# Patient Record
Sex: MALE | Race: WHITE | Employment: STUDENT | URBAN - METROPOLITAN AREA
[De-identification: names, ages, dates, MRNs, and addresses within clinical notes are randomized per-mention and may not be internally consistent; named-entity substitution may affect disease eponyms.]

---
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
## 2025-05-13 ENCOUNTER — OFFICE VISIT (OUTPATIENT)
Dept: FAMILY MEDICINE CLINIC | Facility: CLINIC | Age: 20
End: 2025-05-13
Payer: COMMERCIAL

## 2025-05-13 ENCOUNTER — LAB ENCOUNTER (OUTPATIENT)
Dept: LAB | Age: 20
End: 2025-05-13
Attending: NURSE PRACTITIONER
Payer: COMMERCIAL

## 2025-05-13 VITALS
RESPIRATION RATE: 18 BRPM | DIASTOLIC BLOOD PRESSURE: 70 MMHG | OXYGEN SATURATION: 98 % | HEART RATE: 102 BPM | SYSTOLIC BLOOD PRESSURE: 126 MMHG | HEIGHT: 75 IN | BODY MASS INDEX: 22.06 KG/M2 | TEMPERATURE: 99 F | WEIGHT: 177.38 LBS

## 2025-05-13 DIAGNOSIS — R50.9 FEVER, UNSPECIFIED: ICD-10-CM

## 2025-05-13 DIAGNOSIS — J02.9 SORE THROAT: Primary | ICD-10-CM

## 2025-05-13 DIAGNOSIS — J03.90 EXUDATIVE TONSILLITIS: ICD-10-CM

## 2025-05-13 DIAGNOSIS — J02.9 SORE THROAT: ICD-10-CM

## 2025-05-13 DIAGNOSIS — R53.83 FATIGUE, UNSPECIFIED TYPE: ICD-10-CM

## 2025-05-13 LAB
CONTROL LINE PRESENT WITH A CLEAR BACKGROUND (YES/NO): YES YES/NO
KIT LOT #: NORMAL NUMERIC
STREP GRP A CUL-SCR: NEGATIVE

## 2025-05-13 PROCEDURE — 99213 OFFICE O/P EST LOW 20 MIN: CPT | Performed by: NURSE PRACTITIONER

## 2025-05-13 PROCEDURE — 87880 STREP A ASSAY W/OPTIC: CPT | Performed by: NURSE PRACTITIONER

## 2025-05-13 PROCEDURE — 87637 SARSCOV2&INF A&B&RSV AMP PRB: CPT | Performed by: NURSE PRACTITIONER

## 2025-05-13 PROCEDURE — 36415 COLL VENOUS BLD VENIPUNCTURE: CPT

## 2025-05-13 PROCEDURE — 86308 HETEROPHILE ANTIBODY SCREEN: CPT

## 2025-05-13 NOTE — PROGRESS NOTES
Subjective:   Patient ID: Slick Reyes is a 19 year old male.    Patient is a 19 year old male who presents today with his mother for complaints fo fever (TMAX 102F), body aches, chills, sore throat, nasal congestion, fatigue, headache, slight cough x 3 days. Denies runny nose, ear pain, SOB, wheezing or rashes. Decreased appetite, tolerating fluids. Attempted treatment prior to arrival = Dayquil, Advil and Tylenol (LD 0700). No ill contacts they can identify.        History/Other:   Review of Systems   Constitutional:  Positive for appetite change, chills, fatigue and fever.   HENT:  Positive for congestion and sore throat. Negative for ear pain and rhinorrhea.    Respiratory:  Positive for cough. Negative for shortness of breath and wheezing.    Musculoskeletal:  Positive for myalgias.   Skin:  Negative for rash.   Neurological:  Positive for headaches.     Current Medications[1]  Allergies:Allergies[2]    /70   Pulse 102   Temp 98.7 °F (37.1 °C)   Resp 18   Ht 6' 3\" (1.905 m)   Wt 177 lb 6.4 oz (80.5 kg)   SpO2 98%   BMI 22.17 kg/m²       Objective:   Physical Exam  Vitals reviewed.   Constitutional:       General: He is awake. He is not in acute distress.     Appearance: Normal appearance. He is well-developed and well-groomed. He is not ill-appearing, toxic-appearing or diaphoretic.   HENT:      Head: Normocephalic and atraumatic.      Right Ear: Tympanic membrane, ear canal and external ear normal.      Left Ear: Tympanic membrane, ear canal and external ear normal.      Nose: Nose normal.      Mouth/Throat:      Lips: Pink.      Mouth: Mucous membranes are moist. No oral lesions.      Pharynx: Oropharynx is clear. Uvula midline. Posterior oropharyngeal erythema present. No oropharyngeal exudate.      Tonsils: Tonsillar exudate present. 2+ on the right. 2+ on the left.     Cardiovascular:      Rate and Rhythm: Normal rate and regular rhythm.   Pulmonary:      Effort: Pulmonary effort is  normal. No respiratory distress.      Breath sounds: Normal breath sounds and air entry. No decreased breath sounds, wheezing, rhonchi or rales.   Abdominal:      General: Abdomen is flat. Bowel sounds are normal. There is no distension.      Palpations: Abdomen is soft.      Tenderness: There is no abdominal tenderness. There is no guarding.   Lymphadenopathy:      Head:      Right side of head: Tonsillar adenopathy present.      Left side of head: Tonsillar and occipital adenopathy present.      Cervical: No cervical adenopathy.   Skin:     General: Skin is warm and dry.   Neurological:      Mental Status: He is alert and oriented to person, place, and time.   Psychiatric:         Behavior: Behavior is cooperative.         Assessment & Plan:   1. Sore throat    2. Fatigue, unspecified type    3. Fever, unspecified    4. Exudative tonsillitis        Orders Placed This Encounter   Procedures    Strep A Assay W/Optic    Mono Qual, Rfx to EBV-VCA on Neg [E]    SARS-CoV-2/Flu A and B/RSV by PCR ([facility])     Results for orders placed or performed in visit on 05/13/25   Strep A Assay W/Optic    Collection Time: 05/13/25  2:20 PM   Result Value Ref Range    Strep Grp A Screen negative Negative    Control Line Present with a clear background (yes/no) yes Yes/No    Kit Lot # [id number] Numeric    Kit Expiration Date 06/03/2026 Date       Meds This Visit:  Requested Prescriptions      No prescriptions requested or ordered in this encounter     Reviewed POC test results with patient/mother.  Quad screen collected and sent - will notify of results.  Mono testing ordered- will notify of results.  Reassuring physical exam findings. Vitals WNL. No sign of RDS or dehydration at this time.  Supportive care and return to care measures reviewed.  Patient and mother v/u and are comfortable with this plan.    Patient Instructions   Tylenol and Motrin as needed (can alterate every 3-4 hours as needed)  Push fluids, rest  We will notify  you when nasal swab and mono test results received  Return to care for new/worsening symptoms         [1]   No current outpatient medications on file.   [2] No Known Allergies

## 2025-05-13 NOTE — PATIENT INSTRUCTIONS
Tylenol and Motrin as needed (can alterate every 3-4 hours as needed)  Push fluids, rest  We will notify you when nasal swab and mono test results received  Return to care for new/worsening symptoms

## 2025-05-14 LAB
FLUAV + FLUBV RNA SPEC NAA+PROBE: NEGATIVE
FLUAV + FLUBV RNA SPEC NAA+PROBE: NEGATIVE
HETEROPH AB SER QL: POSITIVE
RSV RNA SPEC NAA+PROBE: NEGATIVE
SARS-COV-2 RNA RESP QL NAA+PROBE: NOT DETECTED

## 2025-05-18 ENCOUNTER — HOSPITAL ENCOUNTER (EMERGENCY)
Facility: HOSPITAL | Age: 20
Discharge: HOME | End: 2025-05-18
Attending: EMERGENCY MEDICINE
Payer: COMMERCIAL

## 2025-05-18 ENCOUNTER — APPOINTMENT (OUTPATIENT)
Dept: CT IMAGING | Facility: HOSPITAL | Age: 20
End: 2025-05-18
Attending: EMERGENCY MEDICINE
Payer: COMMERCIAL

## 2025-05-18 VITALS
SYSTOLIC BLOOD PRESSURE: 132 MMHG | DIASTOLIC BLOOD PRESSURE: 64 MMHG | HEIGHT: 74.5 IN | TEMPERATURE: 98 F | OXYGEN SATURATION: 96 % | HEART RATE: 104 BPM | BODY MASS INDEX: 22.73 KG/M2 | RESPIRATION RATE: 16 BRPM | WEIGHT: 179 LBS

## 2025-05-18 DIAGNOSIS — R74.01 TRANSAMINITIS: ICD-10-CM

## 2025-05-18 DIAGNOSIS — B27.90 INFECTIOUS MONONUCLEOSIS WITHOUT COMPLICATION, INFECTIOUS MONONUCLEOSIS DUE TO UNSPECIFIED ORGANISM: Primary | ICD-10-CM

## 2025-05-18 DIAGNOSIS — J03.80 ACUTE BACTERIAL TONSILLITIS: ICD-10-CM

## 2025-05-18 DIAGNOSIS — B96.89 ACUTE BACTERIAL TONSILLITIS: ICD-10-CM

## 2025-05-18 LAB
ALBUMIN SERPL-MCNC: 4.4 G/DL (ref 3.2–4.8)
ALBUMIN/GLOB SERPL: 1.3 {RATIO} (ref 1–2)
ALP LIVER SERPL-CCNC: 243 U/L (ref 45–117)
ALT SERPL-CCNC: 772 U/L (ref 10–49)
ANION GAP SERPL CALC-SCNC: 9 MMOL/L (ref 0–18)
AST SERPL-CCNC: 536 U/L (ref ?–34)
BILIRUB SERPL-MCNC: 0.5 MG/DL (ref 0.3–1.2)
BUN BLD-MCNC: 17 MG/DL (ref 9–23)
BUN/CREAT SERPL: 13.6 (ref 10–20)
CALCIUM BLD-MCNC: 9.3 MG/DL (ref 8.7–10.4)
CHLORIDE SERPL-SCNC: 101 MMOL/L (ref 98–112)
CO2 SERPL-SCNC: 28 MMOL/L (ref 21–32)
CREAT BLD-MCNC: 1.25 MG/DL (ref 0.7–1.3)
EGFRCR SERPLBLD CKD-EPI 2021: 85 ML/MIN/1.73M2 (ref 60–?)
GLOBULIN PLAS-MCNC: 3.4 G/DL (ref 2–3.5)
GLUCOSE BLD-MCNC: 106 MG/DL (ref 70–99)
MAGNESIUM SERPL-MCNC: 1.9 MG/DL (ref 1.6–2.6)
OSMOLALITY SERPL CALC.SUM OF ELEC: 288 MOSM/KG (ref 275–295)
POTASSIUM SERPL-SCNC: 5 MMOL/L (ref 3.5–5.1)
PROT SERPL-MCNC: 7.8 G/DL (ref 5.7–8.2)
SODIUM SERPL-SCNC: 138 MMOL/L (ref 136–145)

## 2025-05-18 PROCEDURE — 83735 ASSAY OF MAGNESIUM: CPT | Performed by: EMERGENCY MEDICINE

## 2025-05-18 PROCEDURE — 85027 COMPLETE CBC AUTOMATED: CPT | Performed by: EMERGENCY MEDICINE

## 2025-05-18 PROCEDURE — 85060 BLOOD SMEAR INTERPRETATION: CPT | Performed by: EMERGENCY MEDICINE

## 2025-05-18 PROCEDURE — 70491 CT SOFT TISSUE NECK W/DYE: CPT | Performed by: EMERGENCY MEDICINE

## 2025-05-18 PROCEDURE — 80053 COMPREHEN METABOLIC PANEL: CPT | Performed by: EMERGENCY MEDICINE

## 2025-05-18 PROCEDURE — 99284 EMERGENCY DEPT VISIT MOD MDM: CPT

## 2025-05-18 PROCEDURE — 99285 EMERGENCY DEPT VISIT HI MDM: CPT

## 2025-05-18 PROCEDURE — 85025 COMPLETE CBC W/AUTO DIFF WBC: CPT | Performed by: EMERGENCY MEDICINE

## 2025-05-18 PROCEDURE — 87430 STREP A AG IA: CPT | Performed by: EMERGENCY MEDICINE

## 2025-05-18 PROCEDURE — 96375 TX/PRO/DX INJ NEW DRUG ADDON: CPT

## 2025-05-18 PROCEDURE — 85007 BL SMEAR W/DIFF WBC COUNT: CPT | Performed by: EMERGENCY MEDICINE

## 2025-05-18 PROCEDURE — 96374 THER/PROPH/DIAG INJ IV PUSH: CPT

## 2025-05-18 PROCEDURE — 96361 HYDRATE IV INFUSION ADD-ON: CPT

## 2025-05-18 RX ORDER — IBUPROFEN 200 MG
200 TABLET ORAL EVERY 6 HOURS PRN
COMMUNITY

## 2025-05-18 RX ORDER — ACETAMINOPHEN AND CODEINE PHOSPHATE 120; 12 MG/5ML; MG/5ML
10 SOLUTION ORAL EVERY 6 HOURS PRN
Qty: 118 ML | Refills: 0 | Status: SHIPPED | OUTPATIENT
Start: 2025-05-18

## 2025-05-18 RX ORDER — IBUPROFEN 600 MG/1
600 TABLET, FILM COATED ORAL EVERY 8 HOURS PRN
Qty: 20 TABLET | Refills: 0 | Status: SHIPPED | OUTPATIENT
Start: 2025-05-18 | End: 2025-05-25

## 2025-05-18 RX ORDER — ACETAMINOPHEN 500 MG
500 TABLET ORAL EVERY 6 HOURS PRN
COMMUNITY

## 2025-05-18 RX ORDER — DEXAMETHASONE SODIUM PHOSPHATE 10 MG/ML
10 INJECTION, SOLUTION INTRAMUSCULAR; INTRAVENOUS ONCE
Status: COMPLETED | OUTPATIENT
Start: 2025-05-18 | End: 2025-05-18

## 2025-05-18 RX ORDER — KETOROLAC TROMETHAMINE 15 MG/ML
15 INJECTION, SOLUTION INTRAMUSCULAR; INTRAVENOUS ONCE
Status: COMPLETED | OUTPATIENT
Start: 2025-05-18 | End: 2025-05-18

## 2025-05-18 NOTE — ED INITIAL ASSESSMENT (HPI)
Pt. Arrived to ED with his mother via walk in for c/o sore throat. Pt. Diagnosed with mono 10 days ago. Pt. Sore throat worsening. Pt. Unable to eat, drink or sleep.

## 2025-05-18 NOTE — ED PROVIDER NOTES
Patient Seen in: [facility] Emergency Department    History     Chief Complaint   Patient presents with    Sore Throat     Pain rated 8/10    Swallowing Problem    Nausea/Vomiting/Diarrhea     Vomiting x2 caused by gag       HPI    19-year-old male who has been having a sore throat for the past 10 days.  Patient was recently seen at immediate care was diagnosed with mono.  Patient is sore throat is been getting worse and is very difficult for him to swallow.  No chest pain or shortness of breath.    History reviewed. Past Medical History[1]    History reviewed. Past Surgical History[2]      Medications :  Prescriptions Prior to Admission[3]     Family History[4]    Smoking Status: Social Hx on file[5]    Constitutional and vital signs reviewed.      Social History and Family History elements reviewed from today, pertinent positives to the presenting problem noted.    Physical Exam     ED Triage Vitals [05/18/25 0353]   /83   Pulse 111   Resp 20   Temp 98.4 °F (36.9 °C)   Temp src    SpO2 96 %   O2 Device None (Room air)       All measures to prevent infection transmission during my interaction with the patient were taken. Handwashing was performed prior to and after the exam.  Stethoscope and any equipment used during my examination was cleaned with super sani-cloth germicidal wipes following the exam.     Physical Exam  Vitals and nursing note reviewed.   HENT:      Mouth/Throat:      Mouth: Mucous membranes are dry.      Pharynx: Pharyngeal swelling, oropharyngeal exudate and posterior oropharyngeal erythema present.      Comments: Uvula midline, bilateral pharyngeal swelling and erythema with exudates.  Cardiovascular:      Rate and Rhythm: Normal rate.   Pulmonary:      Effort: Pulmonary effort is normal.   Abdominal:      Palpations: Abdomen is soft.   Skin:     General: Skin is warm and dry.   Neurological:      General: No focal deficit present.      Mental Status: He is alert and oriented to  person, place, and time.         ED Course        Labs Reviewed   COMP METABOLIC PANEL (14) - Abnormal; Notable for the following components:       Result Value    Glucose 106 (*)      (*)      (*)     Alkaline Phosphatase 243 (*)     All other components within normal limits   CBC WITH DIFFERENTIAL WITH PLATELET - Abnormal; Notable for the following components:    WBC 14.3 (*)     All other components within normal limits   MAGNESIUM - Normal   RAPID STREP A SCREEN (LC) - Normal    Narrative:     A confirmatory culture is recommended if clinically indicated.   SCAN SLIDE   MD BLOOD SMEAR CONSULT       As Interpreted by me    Imaging Results Available and Reviewed while in ED: No results found.  Preliminary Radiology Report  [facility], [facility]  [phone] - Phone    [facility]    NAME: JUVENAL PEDROZA    DATE OF EXAM: 05/18/2025  Patient No:  [id number]  Physician:  LEXUS  YOB: 2005    Past Medical History (entered by Technologist):    Reason For Exam (entered by Technologist):  mono  Other Notes (entered by Technologist):     Additional Information (per [facility]):            CT neck with contrast    Comparison: None    IMPRESSION:  Multistation lymphadenopathy, may be reactive and compatible with reported mononucleosis.  However, there is an area of more focal fluid along the right level 2 cervical lymph nodes (2-44), which measures approximately 14 x 11 mm.  Lymphadenopathy likely has some local mass effect including on the right internal jugular vein which appears markedly flattened and poorly visualized (6-55).  Suppuration is not excluded and could be better assessed with targeted soft tissue ultrasound.  If soft tissue ultrasound is pursued can also consider interrogating the right internal jugular vein to ensure patency.    Pronounced swelling of the Walldeyer's ring, may be compatible with reported mononucleosis.  There is  some associated effacement of the airways most pronounced in the nasopharynx.  Otherwise grossly patent airways.      Report finalized on 05/18/25 at 7:18 AM ET.      Dr. Cecilia Forrest MD  This report has been electronically signed and verified by the Radiologist whose name is printed above.       This report contains privileged and confidential information and is intended solely for the use of the individual or entity to which it is addressed. If you are not the intended recipient of this report, you are hereby notified that any copying, distribution, dissemination or action taken in relation to the contents of this report is strictly prohibited and may be unlawful. If you have received this report in error, please notify the sender immediately at [phone] and permanently delete the original report and destroy any copies or printouts.    ED Medications Administered:   Medications   amoxicillin clavulanate (Augmentin) 875-125 MG per tab 875 mg (has no administration in time range)   sodium chloride 0.9 % IV bolus 1,000 mL (0 mL Intravenous Stopped 5/18/25 0542)   ketorolac (Toradol) 15 MG/ML injection 15 mg (15 mg Intravenous Given 5/18/25 0429)   dexAMETHasone PF (Decadron) 10 MG/ML injection 10 mg (10 mg Intravenous Given 5/18/25 0551)   iopamidol 76% (ISOVUE-370) injection for power injector (60 mL Intravenous Given 5/18/25 0542)         MDM     Vitals:    05/18/25 0353 05/18/25 0445   BP: 130/83 135/80   Pulse: 111 96   Resp: 20 14   Temp: 98.4 °F (36.9 °C)    SpO2: 96% 100%   Weight: 81.2 kg    Height: 189.2 cm (6' 2.5\")      *I personally reviewed and interpreted all ED vitals.    Pulse Ox: 96%, Room air, Normal     Monitor Interpretation:   normal sinus rhythm as interpreted by me.  The cardiac monitor was ordered to monitor cardiac rate and rhythm.    Differential Diagnosis/ Diagnostic Considerations: Pharyngitis, tonsillitis, retropharyngeal abscess    Complicating Factors: The patient already has  does not have a problem list on file. to contribute to the complexity of this ED evaluation.    Medical Decision Making  Amount and/or Complexity of Data Reviewed  Independent Historian: parent  External Data Reviewed: notes.     Details: Reviewed primary care provider notes Dr. Machado to help contribute to patient's medical history  Labs: ordered. Decision-making details documented in ED Course.  Radiology: ordered and independent interpretation performed. Decision-making details documented in ED Course.    Risk  OTC drugs.  Prescription drug management.    Patient feels little bit better.  Tolerating p.o. fluids and oral antibiotics.  I explained all the results to him and his mother at the bedside.  He has a slightly elevated WBC count of 14 along with elevated liver enzymes which are consistent with a mononucleosis infection.  CT shows no signs of an abscess.  There are reactive lymph nodes which are consistent with mononucleosis diagnosis.  However due to the elevated WBC count and clinically with the exudates pharyngeal erythema is concerns superimposed bacterial infection on his tonsils/pharynx.  Will treat with Augmentin.  Will also give him some Tylenol/codeine syrup to help with the pain of swallowing.  They do understand to take all the medication as prescribed.  Follow-up with his primary care provider 1 to 2 days for reevaluation along with recheck his liver function to make sure they come back to normal after the mononucleosis is improving.  Given ENT referral in case the throat pain and severity of symptoms do not get better.  Return to the ER if some continue, get worse, unable to follow-up    Condition upon leaving the department: Stable    Disposition and Plan     Clinical Impression:  1. Infectious mononucleosis without complication, infectious mononucleosis due to unspecified organism    2. Acute bacterial tonsillitis    3. Transaminitis        Disposition:  Discharge    Follow-up:  Drew Machado  MD  [address]  [facility]  [facility] [address]  [phone]    Follow up      Latanya Arriaga MD  [address] [facility] [address]  [phone]    Follow up        Medications Prescribed:  Current Discharge Medication List        START taking these medications    Details   !! ibuprofen 600 MG Oral Tab Take 1 tablet (600 mg total) by mouth every 8 (eight) hours as needed for Pain or Fever.  Qty: 20 tablet, Refills: 0      amoxicillin clavulanate 875-125 MG Oral Tab Take 1 tablet by mouth 2 (two) times daily for 10 days.  Qty: 20 tablet, Refills: 0      acetaminophen-codeine 120-12 MG/5ML Oral Solution Take 10 mL by mouth every 6 (six) hours as needed for Pain.  Qty: 118 mL, Refills: 0    Associated Diagnoses: Infectious mononucleosis without complication, infectious mononucleosis due to unspecified organism; Acute bacterial tonsillitis       !! - Potential duplicate medications found. Please discuss with provider.                           [1]   Past Medical History:   Fetal distress affecting management of mother    Fracture of clavicle    Rt Clavicle    Mononucleosis   [2] History reviewed. No pertinent surgical history.  [3] (Not in a hospital admission)   [4]   Family History  Problem Relation Age of Onset    Other ([place]) Mother     Cancer Other         mga; lung    High Cholesterol Maternal Grandmother     High Blood Pressure Maternal Grandmother     High Cholesterol Maternal Grandfather     Asthma Other         mggm    High Blood Pressure Maternal Grandfather     High Blood Pressure Other         mggm    High Blood Pressure Other         mggf   [5]   Social History  Socioeconomic History    Marital status: Single   Tobacco Use    Smoking status: Never    Smokeless tobacco: Never   Vaping Use    Vaping status: Never Used   Substance and Sexual Activity    Alcohol use: Never    Drug use: Never   Other Topics Concern    Reaction to local anesthetic No    Pt has a pacemaker No    Pt has a  defibrillator No

## 2025-05-19 LAB
BASOPHILS # BLD: 0 X10(3) UL (ref 0–0.2)
BASOPHILS NFR BLD: 0 %
DEPRECATED RDW RBC AUTO: 41.9 FL (ref 35.1–46.3)
EOSINOPHIL # BLD: 0 X10(3) UL (ref 0–0.7)
EOSINOPHIL NFR BLD: 0 %
ERYTHROCYTE [DISTWIDTH] IN BLOOD BY AUTOMATED COUNT: 12.5 % (ref 11–15)
HCT VFR BLD AUTO: 44.2 % (ref 39–53)
HGB BLD-MCNC: 14.3 G/DL (ref 13–17.5)
LYMPHOCYTES NFR BLD: 35 %
LYMPHOCYTES NFR BLD: 8.15 X10(3) UL (ref 1.5–5)
MCH RBC QN AUTO: 29.3 PG (ref 26–34)
MCHC RBC AUTO-ENTMCNC: 32.4 G/DL (ref 31–37)
MCV RBC AUTO: 90.6 FL (ref 80–100)
MONOCYTES # BLD: 0.86 X10(3) UL (ref 0.1–1)
MONOCYTES NFR BLD: 6 %
MORPHOLOGY: NORMAL
NEUTROPHILS # BLD AUTO: 3.69 X10 (3) UL (ref 1.5–7.7)
NEUTROPHILS NFR BLD: 33 %
NEUTS BAND NFR BLD: 4 %
NEUTS HYPERSEG # BLD: 5.29 X10(3) UL (ref 1.5–7.7)
PLATELET # BLD AUTO: 162 10(3)UL (ref 150–450)
RBC # BLD AUTO: 4.88 X10(6)UL (ref 4.3–5.7)
TOTAL CELLS COUNTED BLD: 100
VARIANT LYMPHS NFR BLD MANUAL: 22 % (ref ?–4)
WBC # BLD AUTO: 14.3 X10(3) UL (ref 4–11)

## 2025-05-20 ENCOUNTER — HOSPITAL ENCOUNTER (OUTPATIENT)
Age: 20
Discharge: HOME | End: 2025-05-20
Attending: EMERGENCY MEDICINE
Payer: COMMERCIAL

## 2025-05-20 ENCOUNTER — TELEPHONE (OUTPATIENT)
Dept: FAMILY MEDICINE CLINIC | Facility: CLINIC | Age: 20
End: 2025-05-20

## 2025-05-20 VITALS
HEART RATE: 92 BPM | RESPIRATION RATE: 16 BRPM | DIASTOLIC BLOOD PRESSURE: 67 MMHG | OXYGEN SATURATION: 98 % | SYSTOLIC BLOOD PRESSURE: 146 MMHG | TEMPERATURE: 101 F

## 2025-05-20 DIAGNOSIS — B27.90 INFECTIOUS MONONUCLEOSIS WITHOUT COMPLICATION, INFECTIOUS MONONUCLEOSIS DUE TO UNSPECIFIED ORGANISM: Primary | ICD-10-CM

## 2025-05-20 DIAGNOSIS — E86.0 DEHYDRATION: ICD-10-CM

## 2025-05-20 DIAGNOSIS — E87.5 HYPERKALEMIA: ICD-10-CM

## 2025-05-20 LAB
#MXD IC: 1.2 X10ˆ3/UL (ref 0.1–1)
BASOPHILS # BLD AUTO: 0.03 X10(3) UL (ref 0–0.2)
BASOPHILS NFR BLD AUTO: 0.2 %
BUN BLD-MCNC: 21 MG/DL (ref 7–18)
CHLORIDE BLD-SCNC: 101 MMOL/L (ref 98–112)
CO2 BLD-SCNC: 28 MMOL/L (ref 21–32)
CREAT BLD-MCNC: 1.4 MG/DL (ref 0.7–1.3)
DEPRECATED RDW RBC AUTO: 43.9 FL (ref 35.1–46.3)
EGFRCR SERPLBLD CKD-EPI 2021: 74 ML/MIN/1.73M2 (ref 60–?)
EOSINOPHIL # BLD AUTO: 0.03 X10(3) UL (ref 0–0.7)
EOSINOPHIL NFR BLD AUTO: 0.2 %
ERYTHROCYTE [DISTWIDTH] IN BLOOD BY AUTOMATED COUNT: 12.7 % (ref 11–15)
GLUCOSE BLD-MCNC: 105 MG/DL (ref 70–99)
HCT VFR BLD AUTO: 46 % (ref 39–53)
HCT VFR BLD AUTO: 46.1 % (ref 39–53)
HCT VFR BLD CALC: 50 % (ref 37–53)
HGB BLD-MCNC: 14.3 G/DL (ref 13–17.5)
HGB BLD-MCNC: 14.7 G/DL (ref 13–17.5)
IMM GRANULOCYTES # BLD AUTO: 0.13 X10(3) UL (ref 0–1)
IMM GRANULOCYTES NFR BLD: 0.8 %
ISTAT IONIZED CALCIUM FOR CHEM 8: 1.21 MMOL/L (ref 1.12–1.32)
LYMPHOCYTES # BLD AUTO: 10.1 X10ˆ3/UL (ref 1.5–5)
LYMPHOCYTES # BLD AUTO: 10.84 X10(3) UL (ref 1.5–5)
LYMPHOCYTES NFR BLD AUTO: 69 %
LYMPHOCYTES NFR BLD AUTO: 70.1 %
MCH RBC QN AUTO: 29.1 PG (ref 26–34)
MCH RBC QN AUTO: 29.8 PG (ref 26–34)
MCHC RBC AUTO-ENTMCNC: 31.1 G/DL (ref 31–37)
MCHC RBC AUTO-ENTMCNC: 31.9 G/DL (ref 31–37)
MCV RBC AUTO: 93.5 FL (ref 80–100)
MCV RBC AUTO: 93.5 FL (ref 80–100)
MIXED CELL %: 8.2 %
MONOCYTES # BLD AUTO: 1.16 X10(3) UL (ref 0.1–1)
MONOCYTES NFR BLD AUTO: 7.5 %
NEUTROPHILS # BLD AUTO: 3.28 X10 (3) UL (ref 1.5–7.7)
NEUTROPHILS # BLD AUTO: 3.28 X10(3) UL (ref 1.5–7.7)
NEUTROPHILS # BLD AUTO: 3.4 X10ˆ3/UL (ref 1.5–7.7)
NEUTROPHILS NFR BLD AUTO: 21.2 %
NEUTROPHILS NFR BLD AUTO: 22.8 %
PLATELET # BLD AUTO: 142 10(3)UL (ref 150–450)
POTASSIUM BLD-SCNC: 5.5 MMOL/L (ref 3.6–5.1)
RBC # BLD AUTO: 4.92 X10ˆ6/UL (ref 4.3–5.7)
RBC # BLD AUTO: 4.93 X10(6)UL (ref 4.3–5.7)
SODIUM BLD-SCNC: 140 MMOL/L (ref 136–145)
WBC # BLD AUTO: 14.7 X10ˆ3/UL (ref 4–11)
WBC # BLD AUTO: 15.5 X10(3) UL (ref 4–11)

## 2025-05-20 PROCEDURE — 85025 COMPLETE CBC W/AUTO DIFF WBC: CPT | Performed by: EMERGENCY MEDICINE

## 2025-05-20 PROCEDURE — 80047 BASIC METABLC PNL IONIZED CA: CPT

## 2025-05-20 PROCEDURE — 96361 HYDRATE IV INFUSION ADD-ON: CPT

## 2025-05-20 PROCEDURE — 96375 TX/PRO/DX INJ NEW DRUG ADDON: CPT

## 2025-05-20 PROCEDURE — 99214 OFFICE O/P EST MOD 30 MIN: CPT

## 2025-05-20 PROCEDURE — 96374 THER/PROPH/DIAG INJ IV PUSH: CPT

## 2025-05-20 RX ORDER — ACETAMINOPHEN 500 MG
1000 TABLET ORAL ONCE
Status: COMPLETED | OUTPATIENT
Start: 2025-05-20 | End: 2025-05-20

## 2025-05-20 RX ORDER — KETOROLAC TROMETHAMINE 30 MG/ML
15 INJECTION, SOLUTION INTRAMUSCULAR; INTRAVENOUS ONCE
Status: COMPLETED | OUTPATIENT
Start: 2025-05-20 | End: 2025-05-20

## 2025-05-20 RX ORDER — SODIUM CHLORIDE 9 MG/ML
1000 INJECTION, SOLUTION INTRAVENOUS ONCE
Status: COMPLETED | OUTPATIENT
Start: 2025-05-20 | End: 2025-05-20

## 2025-05-20 RX ORDER — IBUPROFEN 600 MG/1
600 TABLET, FILM COATED ORAL ONCE
Status: DISCONTINUED | OUTPATIENT
Start: 2025-05-20 | End: 2025-05-20

## 2025-05-20 RX ORDER — DEXAMETHASONE SODIUM PHOSPHATE 10 MG/ML
10 INJECTION, SOLUTION INTRAMUSCULAR; INTRAVENOUS ONCE
Status: COMPLETED | OUTPATIENT
Start: 2025-05-20 | End: 2025-05-20

## 2025-05-20 RX ORDER — ACETAMINOPHEN AND CODEINE PHOSPHATE 300; 30 MG/12.5ML; MG/12.5ML
12.5 SOLUTION ORAL 4 TIMES DAILY PRN
Qty: 118 ML | Refills: 0 | Status: SHIPPED | OUTPATIENT
Start: 2025-05-20

## 2025-05-20 NOTE — ED QUICK NOTES
Patient states he is feeling slightly better. Spitting saliva into a bucket. States it is too hard for him to swallow. IV fluids completed. IV changed to SL.

## 2025-05-20 NOTE — ED PROVIDER NOTES
Patient Seen in: [facility]      History     Chief Complaint   Patient presents with    Cough     Need refill on prescription for tylenol with codeine for Mono - Entered by patient     Stated Complaint: Cough - Need refill on prescription for tylenol with codeine for Mono    Subjective:     19-year-old male presents today for evaluation sore throat trouble swallowing.  Patient reports recently diagnosed with mono.  He has been sick for 10 to 14 days.  Was worse yesterday has seemed to plateau off.  Nearly out of his Tylenol with codeine that he was given for his sore throat.  No vomiting or diarrhea.  No cough.    Objective:   Past Medical History:    Fetal distress affecting management of mother    Fracture of clavicle    Rt Clavicle    Mononucleosis              History reviewed. No pertinent surgical history.             Social History     Socioeconomic History    Marital status: Single   Tobacco Use    Smoking status: Never    Smokeless tobacco: Never   Vaping Use    Vaping status: Never Used   Substance and Sexual Activity    Alcohol use: Never    Drug use: Never   Other Topics Concern    Reaction to local anesthetic No    Pt has a pacemaker No    Pt has a defibrillator No   Social History Narrative    ** Merged History Encounter **                     Physical Exam     ED Triage Vitals [05/20/25 1640]   /66   Pulse 107   Resp 21   Temp 98.3 °F (36.8 °C)   Temp src Oral   SpO2 100 %   O2 Device None (Room air)       Current:/67   Pulse 92   Temp (!) 100.8 °F (38.2 °C) (Oral)   Resp 16   SpO2 98%         Physical Exam  Vitals reviewed.   Constitutional:       General: He is not in acute distress.     Appearance: Normal appearance. He is not ill-appearing or toxic-appearing.   HENT:      Head: Normocephalic and atraumatic.      Mouth/Throat:      Mouth: Mucous membranes are moist.      Pharynx: Uvula midline. Oropharyngeal exudate and posterior oropharyngeal erythema present. No  pharyngeal swelling or uvula swelling.      Tonsils: Tonsillar exudate present. No tonsillar abscesses.   Eyes:      Extraocular Movements: Extraocular movements intact.      Conjunctiva/sclera: Conjunctivae normal.   Cardiovascular:      Rate and Rhythm: Normal rate and regular rhythm.   Pulmonary:      Effort: Pulmonary effort is normal.      Breath sounds: Normal breath sounds.   Abdominal:      Palpations: Abdomen is soft.      Tenderness: There is no abdominal tenderness.   Musculoskeletal:      Cervical back: Neck supple.   Lymphadenopathy:      Cervical: Cervical adenopathy present.   Skin:     General: Skin is warm and dry.   Neurological:      Mental Status: He is alert and oriented to person, place, and time.   Psychiatric:         Mood and Affect: Mood normal.         ED Course     Labs Reviewed   POCT CBC - Abnormal; Notable for the following components:       Result Value    WBC IC 14.7 (*)     # Lymphocyte 10.1 (*)     # Mixed Cells 1.2 (*)     All other components within normal limits   POCT ISTAT CHEM8 CARTRIDGE - Abnormal; Notable for the following components:    ISTAT BUN 21 (*)     ISTAT Potassium 5.5 (*)     ISTAT Glucose 105 (*)     ISTAT Creatinine 1.40 (*)     All other components within normal limits   CBC W AUTO DIFF     Imaging:  No results found.    ED Course as of 05/20/25 1823  ------------------------------------------------------------  Time: 05/20 1652  Comment: Patient had lab work and CT scan done on 5/18/2025.  White count 14,000, LFTs elevated with AST of 536 and ALT of 772.  CT scan showed no abscess.  Patient tested positive for mono on 5/13/2025.  Patient currently taking antibiotics.  ------------------------------------------------------------  Time: 05/20 1716  Comment: Work reveals white count 14.7 with a normal hemoglobin and platelet count 156 which was clumped.  BUN was 21, creatinine 1.4, potassium  5.5.  ------------------------------------------------------------  Time: 05/20 1717  Comment: BUN was 17, creatinine 1.25 and potassium was 5.0 on 5-18.  ------------------------------------------------------------  Time: 05/20 1814  Comment: Discussed with patient and dad at length.  Patient able to swallow here.  Has pain but is able to swallow p.o. Will send home with Tylenol with codeine.  Will send home with Lokelma for hyperkalemia.  Patient will take Pedialyte.  Patient dad aware of elevated potassium and creatinine and need to follow-up.  Will push fluids.          MDM        19 year old male with known history of mono with sore throat swelling of the throat.  Will give fluids Toradol steroids check labs.    Differential diagnosis (including but not limited to):  Dehydration, electrolyte abnormality, mononucleosis    ED course:  Pulse Ox: 98% on room air which is normal      Comment: Please note this report has been produced using speech recognition software and may contain errors related to that system including errors in grammar, punctuation, and spelling, as well as words and phrases that may be inappropriate. If there are any questions or concerns please feel free to contact the dictating provider for clarification.          Medical Decision Making      Disposition and Plan     Clinical Impression:  1. Infectious mononucleosis without complication, infectious mononucleosis due to unspecified organism    2. Dehydration    3. Hyperkalemia         Disposition:  Discharge  5/20/2025  6:21 pm    Follow-up:  Drew Machado MD  [address] [facility] [id number] [phone]    Schedule an appointment as soon as possible for a visit             Medications Prescribed:  Discharge Medication List as of 5/20/2025  6:23 PM        START taking these medications    Details   !! Acetaminophen-Codeine 300-30 MG/12.5ML Oral Solution Take 12.5 mL by mouth 4 (four) times daily as needed (for pain)., Normal, Disp-118  mL, R-0      sodium zirconium cyclosilicate (LOKELMA) 10 g Oral Powd Pack Take 1 packet (10 g total) by mouth one time for 1 dose., Normal, Disp-1 packet, R-0       !! - Potential duplicate medications found. Please discuss with provider.          Supplementary Documentation:                                                            Dillan Velasco MD  5/20/2025  4:49 PM

## 2025-05-20 NOTE — TELEPHONE ENCOUNTER
WIC visit follow up.  Parent reports requesting refill of Tylenol #3.  States only thing that helps sore throat and ease of swallowing.  Still has severe sore throat.  No difficulty breathing.  Recently dx with mono in ER on 5/18.  Discussed limitation of WIC. Recommend follow up in IC or ER. Verbalized understanding.  Discussed available appointment with ENT for tomorrow AM with Dr. Carreon.  Parent declined.  Prefers to go to IC today.

## 2025-05-20 NOTE — DISCHARGE INSTRUCTIONS
Thank you for visiting our emergency department for your healthcare needs.  Please follow-up with your regular doctor in the next 1 to 2 days.  If you have any additional problems please return to the immediate care.  Please take over-the-counter Tylenol and/or Motrin for pain and fevers.  Take Lokelma as prescribed.  Please take Tylenol with codeine as prescribed.  Please use Pedialyte for hydration.

## 2025-05-20 NOTE — ED QUICK NOTES
Medicated for fever with Tylenol. Encouraged patient to take sips of water for pain and hydration.

## 2025-05-20 NOTE — ED INITIAL ASSESSMENT (HPI)
Seen in the ED 5/18 and dx with mono. Given Decadron and T3 for pain. C/o difficulty swallowing with swollen glands. Took T3 at 10am. Requesting RX for more pain meds.

## (undated) NOTE — LETTER
Date & Time: 8/18/2021, 10:37 AM  Patient: Luis Daniel Chamberlain  Encounter Provider(s):    TRUPTI Carcamo       To Whom It May Concern:    Daphne Ortega was seen and treated in our department on 8/18/2021.  He should not return to school until 08/1

## (undated) NOTE — ED AVS SNAPSHOT
Parent/Legal Guardian Access to the Online [employer] Record of a Patient 15to 16Years Old  Return completed form by Secure email to [facility] Department: [email or web address]     Requirements and Procedures   Under [facility] MyChart ID and password with another person, that person may be able to view my or my child’s health information, and health information about someone who has authorized me as a MyChart proxy.    ·  I agree that it is my responsibility to select a confident Sign-Up Form and I agree to its terms.        Authorization Form     Please enter Patient’s information below:   Name (last, first, middle initial) __________________________________________   Gender  Male  Female    Last 4 Digits of Social Security Number Parent/Legal Guardian Signature                                  For Patient (1517 years of age)  I agree to allow my parent/legal guardian, named above, online access to my medical information currently available and that may become available as a result

## (undated) NOTE — LETTER
Date & Time: 12/9/2021, 7:09 PM  Patient: Amelia Cox  Encounter Provider(s):    TRUPTI Chacko       To Whom It May Concern:    Amelia Cox was seen and treated in our department on 12/9/2021. He can return to school Monday 12/13/2021.